# Patient Record
Sex: FEMALE | Race: OTHER | URBAN - METROPOLITAN AREA
[De-identification: names, ages, dates, MRNs, and addresses within clinical notes are randomized per-mention and may not be internally consistent; named-entity substitution may affect disease eponyms.]

---

## 2024-06-06 ENCOUNTER — EMERGENCY (EMERGENCY)
Facility: HOSPITAL | Age: 29
LOS: 1 days | Discharge: HOME | End: 2024-06-06
Attending: EMERGENCY MEDICINE | Admitting: EMERGENCY MEDICINE
Payer: COMMERCIAL

## 2024-06-06 VITALS
OXYGEN SATURATION: 99 % | HEIGHT: 64 IN | RESPIRATION RATE: 18 BRPM | SYSTOLIC BLOOD PRESSURE: 128 MMHG | TEMPERATURE: 98 F | HEART RATE: 71 BPM | DIASTOLIC BLOOD PRESSURE: 80 MMHG | WEIGHT: 128.09 LBS

## 2024-06-06 DIAGNOSIS — Z87.440 PERSONAL HISTORY OF URINARY (TRACT) INFECTIONS: ICD-10-CM

## 2024-06-06 DIAGNOSIS — R10.31 RIGHT LOWER QUADRANT PAIN: ICD-10-CM

## 2024-06-06 DIAGNOSIS — K42.9 UMBILICAL HERNIA WITHOUT OBSTRUCTION OR GANGRENE: ICD-10-CM

## 2024-06-06 LAB
ALBUMIN SERPL ELPH-MCNC: 4.5 G/DL — SIGNIFICANT CHANGE UP (ref 3.3–5)
ALP SERPL-CCNC: 91 U/L — SIGNIFICANT CHANGE UP (ref 40–120)
ALT FLD-CCNC: 11 U/L — SIGNIFICANT CHANGE UP (ref 10–45)
ANION GAP SERPL CALC-SCNC: 13 MMOL/L — SIGNIFICANT CHANGE UP (ref 5–17)
APPEARANCE UR: CLEAR — SIGNIFICANT CHANGE UP
AST SERPL-CCNC: 18 U/L — SIGNIFICANT CHANGE UP (ref 10–40)
BACTERIA # UR AUTO: NEGATIVE /HPF — SIGNIFICANT CHANGE UP
BASOPHILS # BLD AUTO: 0.04 K/UL — SIGNIFICANT CHANGE UP (ref 0–0.2)
BASOPHILS NFR BLD AUTO: 0.3 % — SIGNIFICANT CHANGE UP (ref 0–2)
BILIRUB SERPL-MCNC: 0.5 MG/DL — SIGNIFICANT CHANGE UP (ref 0.2–1.2)
BILIRUB UR-MCNC: NEGATIVE — SIGNIFICANT CHANGE UP
BUN SERPL-MCNC: 9 MG/DL — SIGNIFICANT CHANGE UP (ref 7–23)
CALCIUM SERPL-MCNC: 9.9 MG/DL — SIGNIFICANT CHANGE UP (ref 8.4–10.5)
CAST: 2 /LPF — SIGNIFICANT CHANGE UP (ref 0–4)
CHLORIDE SERPL-SCNC: 98 MMOL/L — SIGNIFICANT CHANGE UP (ref 96–108)
CO2 SERPL-SCNC: 25 MMOL/L — SIGNIFICANT CHANGE UP (ref 22–31)
COLOR SPEC: YELLOW — SIGNIFICANT CHANGE UP
CREAT SERPL-MCNC: 0.65 MG/DL — SIGNIFICANT CHANGE UP (ref 0.5–1.3)
DIFF PNL FLD: NEGATIVE — SIGNIFICANT CHANGE UP
EGFR: 124 ML/MIN/1.73M2 — SIGNIFICANT CHANGE UP
EOSINOPHIL # BLD AUTO: 0.1 K/UL — SIGNIFICANT CHANGE UP (ref 0–0.5)
EOSINOPHIL NFR BLD AUTO: 0.8 % — SIGNIFICANT CHANGE UP (ref 0–6)
GLUCOSE SERPL-MCNC: 88 MG/DL — SIGNIFICANT CHANGE UP (ref 70–99)
GLUCOSE UR QL: NEGATIVE MG/DL — SIGNIFICANT CHANGE UP
HCG SERPL-ACNC: <1 MIU/ML — SIGNIFICANT CHANGE UP
HCT VFR BLD CALC: 40.8 % — SIGNIFICANT CHANGE UP (ref 34.5–45)
HGB BLD-MCNC: 13.4 G/DL — SIGNIFICANT CHANGE UP (ref 11.5–15.5)
IMM GRANULOCYTES NFR BLD AUTO: 0.3 % — SIGNIFICANT CHANGE UP (ref 0–0.9)
KETONES UR-MCNC: NEGATIVE MG/DL — SIGNIFICANT CHANGE UP
LEUKOCYTE ESTERASE UR-ACNC: ABNORMAL
LIDOCAIN IGE QN: 42 U/L — SIGNIFICANT CHANGE UP (ref 7–60)
LYMPHOCYTES # BLD AUTO: 2.44 K/UL — SIGNIFICANT CHANGE UP (ref 1–3.3)
LYMPHOCYTES # BLD AUTO: 20.3 % — SIGNIFICANT CHANGE UP (ref 13–44)
MCHC RBC-ENTMCNC: 31.8 PG — SIGNIFICANT CHANGE UP (ref 27–34)
MCHC RBC-ENTMCNC: 32.8 GM/DL — SIGNIFICANT CHANGE UP (ref 32–36)
MCV RBC AUTO: 96.7 FL — SIGNIFICANT CHANGE UP (ref 80–100)
MONOCYTES # BLD AUTO: 0.74 K/UL — SIGNIFICANT CHANGE UP (ref 0–0.9)
MONOCYTES NFR BLD AUTO: 6.2 % — SIGNIFICANT CHANGE UP (ref 2–14)
NEUTROPHILS # BLD AUTO: 8.67 K/UL — HIGH (ref 1.8–7.4)
NEUTROPHILS NFR BLD AUTO: 72.1 % — SIGNIFICANT CHANGE UP (ref 43–77)
NITRITE UR-MCNC: NEGATIVE — SIGNIFICANT CHANGE UP
NRBC # BLD: 0 /100 WBCS — SIGNIFICANT CHANGE UP (ref 0–0)
PH UR: 7 — SIGNIFICANT CHANGE UP (ref 5–8)
PLATELET # BLD AUTO: 366 K/UL — SIGNIFICANT CHANGE UP (ref 150–400)
POTASSIUM SERPL-MCNC: 3.8 MMOL/L — SIGNIFICANT CHANGE UP (ref 3.5–5.3)
POTASSIUM SERPL-SCNC: 3.8 MMOL/L — SIGNIFICANT CHANGE UP (ref 3.5–5.3)
PROT SERPL-MCNC: 7.8 G/DL — SIGNIFICANT CHANGE UP (ref 6–8.3)
PROT UR-MCNC: NEGATIVE MG/DL — SIGNIFICANT CHANGE UP
RBC # BLD: 4.22 M/UL — SIGNIFICANT CHANGE UP (ref 3.8–5.2)
RBC # FLD: 12 % — SIGNIFICANT CHANGE UP (ref 10.3–14.5)
RBC CASTS # UR COMP ASSIST: 0 /HPF — SIGNIFICANT CHANGE UP (ref 0–4)
SODIUM SERPL-SCNC: 136 MMOL/L — SIGNIFICANT CHANGE UP (ref 135–145)
SP GR SPEC: 1.01 — SIGNIFICANT CHANGE UP (ref 1–1.03)
SQUAMOUS # UR AUTO: 3 /HPF — SIGNIFICANT CHANGE UP (ref 0–5)
UROBILINOGEN FLD QL: 0.2 MG/DL — SIGNIFICANT CHANGE UP (ref 0.2–1)
WBC # BLD: 12.03 K/UL — HIGH (ref 3.8–10.5)
WBC # FLD AUTO: 12.03 K/UL — HIGH (ref 3.8–10.5)
WBC UR QL: 4 /HPF — SIGNIFICANT CHANGE UP (ref 0–5)

## 2024-06-06 PROCEDURE — 74177 CT ABD & PELVIS W/CONTRAST: CPT | Mod: MC

## 2024-06-06 PROCEDURE — 36415 COLL VENOUS BLD VENIPUNCTURE: CPT

## 2024-06-06 PROCEDURE — 96374 THER/PROPH/DIAG INJ IV PUSH: CPT | Mod: XU

## 2024-06-06 PROCEDURE — 83690 ASSAY OF LIPASE: CPT

## 2024-06-06 PROCEDURE — 99284 EMERGENCY DEPT VISIT MOD MDM: CPT | Mod: 25

## 2024-06-06 PROCEDURE — 80053 COMPREHEN METABOLIC PANEL: CPT

## 2024-06-06 PROCEDURE — 85025 COMPLETE CBC W/AUTO DIFF WBC: CPT

## 2024-06-06 PROCEDURE — 74177 CT ABD & PELVIS W/CONTRAST: CPT | Mod: 26,MC

## 2024-06-06 PROCEDURE — 84702 CHORIONIC GONADOTROPIN TEST: CPT

## 2024-06-06 PROCEDURE — 99285 EMERGENCY DEPT VISIT HI MDM: CPT

## 2024-06-06 PROCEDURE — 81001 URINALYSIS AUTO W/SCOPE: CPT

## 2024-06-06 RX ORDER — ACETAMINOPHEN 500 MG
1000 TABLET ORAL ONCE
Refills: 0 | Status: COMPLETED | OUTPATIENT
Start: 2024-06-06 | End: 2024-06-06

## 2024-06-06 RX ORDER — SODIUM CHLORIDE 9 MG/ML
1000 INJECTION INTRAMUSCULAR; INTRAVENOUS; SUBCUTANEOUS ONCE
Refills: 0 | Status: COMPLETED | OUTPATIENT
Start: 2024-06-06 | End: 2024-06-06

## 2024-06-06 RX ORDER — IOHEXOL 300 MG/ML
30 INJECTION, SOLUTION INTRAVENOUS ONCE
Refills: 0 | Status: COMPLETED | OUTPATIENT
Start: 2024-06-06 | End: 2024-06-06

## 2024-06-06 RX ADMIN — Medication 400 MILLIGRAM(S): at 15:32

## 2024-06-06 RX ADMIN — IOHEXOL 30 MILLILITER(S): 300 INJECTION, SOLUTION INTRAVENOUS at 15:32

## 2024-06-06 RX ADMIN — SODIUM CHLORIDE 1000 MILLILITER(S): 9 INJECTION INTRAMUSCULAR; INTRAVENOUS; SUBCUTANEOUS at 15:33

## 2024-06-06 NOTE — ED PROVIDER NOTE - PATIENT PORTAL LINK FT
You can access the FollowMyHealth Patient Portal offered by [facility] by registering at the following website: [email or web address]. By joining [employer]’s FollowMyHealth portal, you will also be able to view your health information using other applications (apps) compatible with our system.

## 2024-06-06 NOTE — ED PROVIDER NOTE - PROGRESS NOTE DETAILS
wbc 12, seen by surgery (per surgery were aware of pt pta for concern of appendicitis, and as a result evaluating her), will dc, surg to rpt cbc as outpt. CT neg for appendicitis here, no tenderness on exam on reevaluation/guarding/rebound.

## 2024-06-06 NOTE — ED PROVIDER NOTE - CARE PROVIDER_API CALL
Nehemiah Guerra [facility]  Surgery  [address] [place] [id number]  Phone: [phone]  Fax: [phone]  Follow Up Time:

## 2024-06-06 NOTE — ED PROVIDER NOTE - NS_EDPROVIDERDISPOUSERTYPE_ED_A_ED
Patient with blood cultures growing E.Coli.  - Follow blood and urine cultures.  - c/w Zosyn. Attending Attestation (For Attendings USE Only)... Patient with blood cultures growing E.Coli.  see above  - c/w Zosyn.

## 2024-06-06 NOTE — CONSULT NOTE ADULT - SUBJECTIVE AND OBJECTIVE BOX
27yo Female pt with PMH    In the ED, pt afebrile, nontachycardic, normotensive, and satting on RA. On exam, _____. Labs significant for ______. CTAP showing _____.     PMH:  PSHx:  Medications:  Allergies:  Social Hx:  Family Hx: Denies family hx of IBS, Crohn's, UC, or colon cancer.  Last colonoscopy:  Last EGD:    T(C): 36.9 (06-06-24 @ 14:58), Max: 36.9 (06-06-24 @ 14:58)  HR: 71 (06-06-24 @ 14:58) (71 - 71)  BP: 128/80 (06-06-24 @ 14:58) (128/80 - 128/80)  RR: 18 (06-06-24 @ 14:58) (18 - 18)  SpO2: 99% (06-06-24 @ 14:58) (99% - 99%)    Physical Exam  General: AAOx3, NAD, laying comfortably in bed  Cardio: S1,S2, No MRG  Pulm: Nonlabored breathing  Abdomen:  Extremities: WWP, peripheral pulses appreciated      LABS:                        13.4   12.03 )-----------( 366      ( 06 Jun 2024 15:34 )             40.8     06-06    136  |  98  |  9   ----------------------------<  88  3.8   |  25  |  0.65    Ca    9.9      06 Jun 2024 15:34    TPro  7.8  /  Alb  4.5  /  TBili  0.5  /  DBili  x   /  AST  18  /  ALT  11  /  AlkPhos  91  06-06    CT Abdomen and Pelvis w/ Oral Cont and w/ IV Cont:   ACC: [id number] EXAM:  CT ABDOMEN AND PELVIS OC IC   ORDERED BY: BENJIE SINGH     PROCEDURE DATE:  06/06/2024          INTERPRETATION:  CLINICAL INFORMATION: Right lower quadrant pain.    COMPARISON: None.    CONTRAST/COMPLICATIONS:  IV Contrast: [id number]  90 cc administered   10 cc discarded  Oral Contrast: Omnipaque 300  Complications: None reported at time of study completion    PROCEDURE:  CT of the Abdomen and Pelvis was performed.  Sagittal and coronal reformats were performed.    FINDINGS:  LOWER CHEST: Within normal limits.    LIVER: Within normal limits.  BILE DUCTS: Normal caliber.  GALLBLADDER: Within normal limits.  SPLEEN: Within normal limits.  PANCREAS: Within normal limits.  ADRENALS: Within normal limits.  KIDNEYS/URETERS: Within normal limits.    BLADDER: Within normal limits.  REPRODUCTIVE ORGANS: Anteverted uterus containing IUD.    BOWEL: No bowel obstruction. Appendix is normal.  PERITONEUM/RETROPERITONEUM: Within normal limits.  VESSELS: Within normal limits.  LYMPH NODES: No lymphadenopathy.  ABDOMINAL WALL: Fat-containing umbilical hernia.  BONES: Within normal limits.    IMPRESSION:  No acute intraabdominopelvic pathology.        --- End of Report ---      ANA ROSA JENSEN MD; Attending Radiologist  This document has been electronically signed. Jun 6 2024  5:03PM (06-06-24 @ 16:59) 29 yo F, no PMHx or PSHx, who presented with x4 day hx of worsening RLQ abdominal pain.     Patient reports that her RLQ abdominal pain began on Monday morning. She felt crampy RLQ pain while jogging, but resolved at rest. Throughout the week her RLQ abdominal pain worsened, now she feels discomfort with breathing and walking. She denied any recent fevers, chills, sob or cp, nausea or vomiting. Passing flatus and last BM was this morning, dark but nonbloody and normal caliber. No recent diarrhea. Of note, patient had a UTI last month that was treated. She was seen by PCP this week and prescribed antibiotics for a +leukocyte esterase on UA but was told to hold off in order to not mask any other abdominal pathology.     In the ED, pt afebrile, nontachycardic, normotensive, and satting on RA. On exam, soft, ND, mild ttp in RLQ. No rebound or guarding. Labs significant for WBC 12, with neutrophilic predominance, remainder wnl. UA neg. CTAP showing no acute intraabdominal pathology. Large Ascending colon stool burden. Fat containing small umbilical hernia.      PMH: denies  PSHx: denies  Medications: zyrtec daily  Allergies: nkda  Social Hx: denies smoking or drug use. Drinks alcohol socially.   Family Hx: Denies family hx of IBS, Crohn's, UC, or colon cancer. Grandmother with breast cancer and multiple myeloma.   Last colonoscopy: never  Last EGD: never    T(C): 36.9 (06-06-24 @ 14:58), Max: 36.9 (06-06-24 @ 14:58)  HR: 71 (06-06-24 @ 14:58) (71 - 71)  BP: 128/80 (06-06-24 @ 14:58) (128/80 - 128/80)  RR: 18 (06-06-24 @ 14:58) (18 - 18)  SpO2: 99% (06-06-24 @ 14:58) (99% - 99%)    Physical Exam  General: AAOx3, NAD, laying comfortably in bed  Cardio: S1,S2, No MRG  Pulm: Nonlabored breathing  Abdomen:  soft, ND, mild ttp in RLQ. No rebound or guarding.   Extremities: WWP, peripheral pulses appreciated      LABS:                        13.4   12.03 )-----------( 366      ( 06 Jun 2024 15:34 )             40.8     06-06    136  |  98  |  9   ----------------------------<  88  3.8   |  25  |  0.65    Ca    9.9      06 Jun 2024 15:34    TPro  7.8  /  Alb  4.5  /  TBili  0.5  /  DBili  x   /  AST  18  /  ALT  11  /  AlkPhos  91  06-06    CT Abdomen and Pelvis w/ Oral Cont and w/ IV Cont:   ACC: [id number] EXAM:  CT ABDOMEN AND PELVIS OC IC   ORDERED BY: BENJIE SINGH     PROCEDURE DATE:  06/06/2024          INTERPRETATION:  CLINICAL INFORMATION: Right lower quadrant pain.    COMPARISON: None.    CONTRAST/COMPLICATIONS:  IV Contrast: [id number]  90 cc administered   10 cc discarded  Oral Contrast: Omnipaque 300  Complications: None reported at time of study completion    PROCEDURE:  CT of the Abdomen and Pelvis was performed.  Sagittal and coronal reformats were performed.    FINDINGS:  LOWER CHEST: Within normal limits.    LIVER: Within normal limits.  BILE DUCTS: Normal caliber.  GALLBLADDER: Within normal limits.  SPLEEN: Within normal limits.  PANCREAS: Within normal limits.  ADRENALS: Within normal limits.  KIDNEYS/URETERS: Within normal limits.    BLADDER: Within normal limits.  REPRODUCTIVE ORGANS: Anteverted uterus containing IUD.    BOWEL: No bowel obstruction. Appendix is normal.  PERITONEUM/RETROPERITONEUM: Within normal limits.  VESSELS: Within normal limits.  LYMPH NODES: No lymphadenopathy.  ABDOMINAL WALL: Fat-containing umbilical hernia.  BONES: Within normal limits.    IMPRESSION:  No acute intraabdominopelvic pathology.        --- End of Report ---      ANA ROSA JENSEN MD; Attending Radiologist  This document has been electronically signed. Jun 6 2024  5:03PM (06-06-24 @ 16:59)

## 2024-06-06 NOTE — ED PROVIDER NOTE - PHYSICAL EXAMINATION
CONSTITUTIONAL: Awake, alert and in no apparent distress.  HEENT: Head is atraumatic. Eyes clear bilaterally, normal EOMI. Airway patent.  CARDIAC: Normal rate, regular rhythm.  Heart sounds S1, S2.   RESPIRATORY: Breath sounds clear and equal bilaterally. no tachypnea, respiratory distress.   GASTROINTESTINAL: Abdomen soft, mild rlq ttp no guarding, distension.  MUSCULOSKELETAL: Spine appears normal, no midline spinal tenderness, range of motion is not limited, no muscle or joint tenderness. no bony tenderness.   NEUROLOGICAL: Alert, no focal deficits, no motor or sensory deficits.  SKIN: Skin normal color for race, warm, dry and intact. No evidence of rash.  PSYCHIATRIC: Normal mood and affect. no apparent risk to self or others.

## 2024-06-06 NOTE — CONSULT NOTE ADULT - ASSESSMENT
27 yo F, no PMHx or PSHx, who presented with x4 day hx of worsening RLQ abdominal pain.  27 yo F, no PMHx or PSHx, who presented with x4 day hx of worsening RLQ abdominal pain. In the ED, pt afebrile, nontachycardic, normotensive, and satting on RA. On exam, soft, ND, mild ttp in RLQ. No rebound or guarding. Labs significant for WBC 12, with neutrophilic predominance, remainder wnl. UA neg. CTAP showing no acute intraabdominal pathology. Large Ascending colon stool burden. Fat containing small umbilical hernia. Low suspicion for acute appendicitis at this time. Dx most c/w constipation. Given mildly elevated WBC, will follow up in outpatient setting to ensure resolution.    Plan:  - D/c home with bowel regimen  - F/u with Dr. Guerra in outpatient setting for repeat lab work.  Plan discussed with chief resident and attending, Dr. Charles.

## 2024-06-06 NOTE — ED ADULT TRIAGE NOTE - ISOLATION TYPE:
None Patient's FSGs are controlled on current medication regimen.  Last A1c reviewed-   Lab Results   Component Value Date    HGBA1C 5.5 04/07/2022     Most recent fingerstick glucose reviewed- No results for input(s): POCTGLUCOSE in the last 24 hours.  Current correctional scale  Medium  Maintain anti-hyperglycemic dose as follows-   Antihyperglycemics (From admission, onward)    Start     Stop Route Frequency Ordered    08/29/22 1432  insulin aspart U-100 injection 1-10 Units         -- SubQ Before meals & nightly PRN 08/29/22 1333        Hold Oral hypoglycemics while patient is in the hospital.

## 2024-06-06 NOTE — ED ADULT NURSE NOTE - OBJECTIVE STATEMENT
29 y/o F presents to the ED c/o RLQ abdominal pain x5 days. States she was seen by her GYN PTA, received an US, and was dx with possible PCOS. GYN referred her to GI who palpated her abdomen, noted RLQ TTP, and sent her here to r/o appendicitis. Notes recent under treated UTI that her GYN gave her new abx for but told her to hold off on taking them until she was evaluated here. Denies fever, N/V/D, constipation, flank pain, and any other complaints at this time. On exam, A&Ox4, NAD, ambulatory on RA. VSS. RLQ TTP. PIV placed. Labs sent. Bolus/tylenol given. Drinking for CT.

## 2024-06-06 NOTE — ED PROVIDER NOTE - OBJECTIVE STATEMENT
27 yo no pmh here w rlq pain for four days, Denies f/c, NVD, black/bloody stool, urinary complaints, focal weakness/numbness, lightheadedness, back pain. some dec appetite, no prior abd surgeries. stated went to gyn this morning, had an US this morning where she was told nothing grossly abnormal to be causing pain. 27 yo no pmh here w rlq pain for four days, Denies f/c, NVD, black/bloody stool, urinary complaints, focal weakness/numbness, lightheadedness, back pain. some dec appetite, no prior abd surgeries. stated went to gyn this morning, had an US this morning where she was told nothing grossly abnormal to be causing pain in ovaries/uterus.

## 2024-06-06 NOTE — ED PROVIDER NOTE - CLINICAL SUMMARY MEDICAL DECISION MAKING FREE TEXT BOX
27 yo no pmh here w rlq pain for four days, Denies f/c, NVD, black/bloody stool, urinary complaints, focal weakness/numbness, lightheadedness, back pain. some dec appetite, no prior abd surgeries. stated went to gyn this morning, had an US this morning where she was told nothing grossly abnormal to be causing pain.  Ddx appendicitis, UTI, colitis, other  labs, CT a/p, UA, reassess.
